# Patient Record
Sex: FEMALE | Race: WHITE | NOT HISPANIC OR LATINO | Employment: OTHER | URBAN - NONMETROPOLITAN AREA
[De-identification: names, ages, dates, MRNs, and addresses within clinical notes are randomized per-mention and may not be internally consistent; named-entity substitution may affect disease eponyms.]

---

## 2017-03-15 ENCOUNTER — CONVERSION ENCOUNTER (OUTPATIENT)
Dept: FAMILY MEDICINE CLINIC | Facility: CLINIC | Age: 62
End: 2017-03-15

## 2019-06-03 VITALS
DIASTOLIC BLOOD PRESSURE: 81 MMHG | BODY MASS INDEX: 45.07 KG/M2 | HEIGHT: 64 IN | WEIGHT: 264 LBS | SYSTOLIC BLOOD PRESSURE: 141 MMHG

## 2020-01-06 ENCOUNTER — OFFICE VISIT (OUTPATIENT)
Dept: CARDIOLOGY | Facility: CLINIC | Age: 65
End: 2020-01-06

## 2020-01-06 VITALS
HEIGHT: 64 IN | HEART RATE: 95 BPM | OXYGEN SATURATION: 94 % | SYSTOLIC BLOOD PRESSURE: 176 MMHG | WEIGHT: 267 LBS | DIASTOLIC BLOOD PRESSURE: 84 MMHG | BODY MASS INDEX: 45.58 KG/M2

## 2020-01-06 DIAGNOSIS — R07.89 CHEST TIGHTNESS: ICD-10-CM

## 2020-01-06 DIAGNOSIS — E66.01 CLASS 3 SEVERE OBESITY WITHOUT SERIOUS COMORBIDITY WITH BODY MASS INDEX (BMI) OF 45.0 TO 49.9 IN ADULT, UNSPECIFIED OBESITY TYPE (HCC): ICD-10-CM

## 2020-01-06 DIAGNOSIS — E11.9 TYPE 2 DIABETES MELLITUS WITHOUT COMPLICATION, WITHOUT LONG-TERM CURRENT USE OF INSULIN (HCC): ICD-10-CM

## 2020-01-06 DIAGNOSIS — R01.1 SYSTOLIC MURMUR: ICD-10-CM

## 2020-01-06 DIAGNOSIS — I10 ESSENTIAL HYPERTENSION: ICD-10-CM

## 2020-01-06 DIAGNOSIS — E78.2 MIXED HYPERLIPIDEMIA: ICD-10-CM

## 2020-01-06 DIAGNOSIS — Z86.73 HISTORY OF TIA (TRANSIENT ISCHEMIC ATTACK): ICD-10-CM

## 2020-01-06 DIAGNOSIS — R00.2 PALPITATIONS: Primary | ICD-10-CM

## 2020-01-06 DIAGNOSIS — R07.9 CHEST PAIN, UNSPECIFIED TYPE: ICD-10-CM

## 2020-01-06 DIAGNOSIS — J44.9 CHRONIC OBSTRUCTIVE PULMONARY DISEASE, UNSPECIFIED COPD TYPE (HCC): ICD-10-CM

## 2020-01-06 PROBLEM — E78.5 HYPERLIPIDEMIA: Status: ACTIVE | Noted: 2020-01-06

## 2020-01-06 PROBLEM — E66.9 OBESITY: Status: ACTIVE | Noted: 2020-01-06

## 2020-01-06 PROCEDURE — 93000 ELECTROCARDIOGRAM COMPLETE: CPT | Performed by: INTERNAL MEDICINE

## 2020-01-06 PROCEDURE — 99214 OFFICE O/P EST MOD 30 MIN: CPT | Performed by: INTERNAL MEDICINE

## 2020-01-06 RX ORDER — LISINOPRIL 20 MG/1
40 TABLET ORAL DAILY
COMMUNITY
Start: 2017-03-15 | End: 2023-03-29 | Stop reason: SDUPTHER

## 2020-01-06 RX ORDER — SIMVASTATIN 40 MG
TABLET ORAL
COMMUNITY
End: 2023-03-29 | Stop reason: SDUPTHER

## 2020-01-06 RX ORDER — ALBUTEROL SULFATE 2.5 MG/3ML
SOLUTION RESPIRATORY (INHALATION)
COMMUNITY

## 2020-01-06 RX ORDER — CLOPIDOGREL BISULFATE 75 MG/1
TABLET ORAL
COMMUNITY
Start: 2017-03-15 | End: 2020-05-11 | Stop reason: SDUPTHER

## 2020-01-06 RX ORDER — GABAPENTIN 300 MG/1
CAPSULE ORAL
COMMUNITY
Start: 2019-12-19 | End: 2023-03-29

## 2020-01-06 RX ORDER — CHOLECALCIFEROL (VITAMIN D3) 25 MCG
TABLET,CHEWABLE ORAL
COMMUNITY
Start: 2017-03-15

## 2020-01-06 RX ORDER — BACLOFEN 10 MG/1
TABLET ORAL
COMMUNITY
Start: 2017-03-15

## 2020-01-06 RX ORDER — DICYCLOMINE HCL 20 MG
TABLET ORAL
COMMUNITY
Start: 2017-03-15

## 2020-01-06 NOTE — PROGRESS NOTES
Cardiology Office Consultation      Encounter Date:  01/06/2020    Patient ID:   Renata Ramesh is a 64 y.o. female.    Reason For Consultation:  Abnormal echocardiogram    History of Present Illness:  Dear Dr. Lo, Kevin EID MD    I had the pleasure of seeing Renata Ramesh today. As you are well aware, this is a 64 y.o. female with no established history of ischemic heart disease.  She does however have a history of hypertension, hyperlipidemia, diabetes mellitus, morbid obesity, TIA, and valvular heart disease.  She presents today to reestablish care and discuss abnormal echocardiogram    She reports intermittent chest discomfort.  She describes it as a pressure located in the center of her chest without radiation to her back neck arm or jaw.  The discomfort can occur with and without activity.  She reports she had some relief of the discomfort when she was treated for acid reflux however the discomfort has returned.  She admits to shortness of breath with activity.  She denies any PND orthopnea.  She denies any syncope or near syncope.    She has a strong family history of premature ischemic heart disease.  1 of her brothers had bypass surgery in his late 30s and the other one had bypass surgery in his 40s.  She reports that she had an echocardiogram performed that demonstrated some leaky valves and that was the reason for her evaluation today.  I do see the results of a 2D echocardiogram that was obtained in April 2019.  Trace mitral and tricuspid insufficiencies were noted at that time.  Diastolic dysfunction was noted as well.    I am concerned with her history of nocturnal hypoxia that she may have sleep apnea.  She is being referred for testing.  I am additionally concerned about her chest discomfort and family history of premature coronary artery disease.  She underwent a stress test a few years ago however she was only able to achieve about 3 minutes on a Umberto protocol.  Despite this her myocardial  "perfusion imaging was negative.  Lastly I am concerned about her TIAs.  With her risk factor profile, if she was having paroxysmal atrial fibrillation this could be the reason for these prior neurological insults.  She is reporting intermittent palpitations.  We will obtain an ambulatory ECG monitor or a loop recorder.    Assessment & Plan    Impressions:  Valvular heart disease with mild tricuspid and mitral insufficiencies  Hypertension  Hyperlipidemia  Morbid obesity  History of TIA/cryptogenic stroke  Hyperlipidemia  Diabetes mellitus  Nocturnal hypoxia concerning for sleep apnea  Palpitations  Gastroesophageal reflux disease    Recommendations:  2D echocardiogram reviewed and demonstrates mild tricuspid and mitral insufficiencies and diastolic dysfunction  Mobile cardiac outpatient telemetry.  If MCOT not approved will move forward with loop recorder  Nuclear stress testing  Sleep study  Follow-up in 6 to 8 weeks  Further recommendations pending above    Objective:    Vitals:  Vitals:    01/06/20 1134   BP: 176/84   BP Location: Right arm   Pulse: 95   SpO2: 94%   Weight: 121 kg (267 lb)   Height: 162.6 cm (64\")       Physical Exam:    General: Alert, cooperative, no distress, appears stated age  Head:  Normocephalic, atraumatic, mucous membranes moist  Eyes:  Conjunctiva/corneas clear, EOM's intact     Neck:  Supple, thick with no bruit  Lungs: Clear to auscultation bilaterally, no wheezes rhonchi rales are noted.  Diminished at the bases  Chest wall: No tenderness  Heart::  Regular rate and rhythm, S1 and S2 normal, 1/6 holosystolic murmur.  No rub or gallop  Abdomen: Soft, non-tender, nondistended bowel sounds active  Extremities: No cyanosis, clubbing, or edema  Pulses: 2+ and symmetric all extremities  Skin:  No rashes or lesions  Neuro/psych: A&O x3. CN II through XII are grossly intact with appropriate affect    History of Present Illness      Allergies:  Allergies   Allergen Reactions   • Sulfa " Antibiotics Shortness Of Breath   • Penicillins Itching       Medication Review:     Current Outpatient Medications:   •  albuterol (PROVENTIL) (2.5 MG/3ML) 0.083% nebulizer solution, albuterol sulfate 2.5 mg/3 mL (0.083 %) solution for nebulization, Disp: , Rfl:   •  baclofen (LIORESAL) 10 MG tablet, baclofen 10 mg tablet, Disp: , Rfl:   •  clopidogrel (PLAVIX) 75 MG tablet, clopidogrel 75 mg tablet, Disp: , Rfl:   •  Cyanocobalamin (B-12) 1000 MCG capsule, B-12 1000 MCG CAPS, Disp: , Rfl:   •  dicyclomine (BENTYL) 20 MG tablet, dicyclomine 20 mg tablet, Disp: , Rfl:   •  gabapentin (NEURONTIN) 300 MG capsule, TAKE 1 CAPSULE BY MOUTH THREE TIMES DAILY AS DIRECTED FOR 30 DAYS, Disp: , Rfl:   •  lisinopril (PRINIVIL,ZESTRIL) 20 MG tablet, lisinopril 20 mg tablet, Disp: , Rfl:   •  metFORMIN (GLUCOPHAGE) 1000 MG tablet, metformin 1,000 mg tablet, Disp: , Rfl:   •  O2 (OXYGEN), Inhale As Needed., Disp: , Rfl:   •  simvastatin (ZOCOR) 40 MG tablet, simvastatin 40 mg tablet, Disp: , Rfl:     Family History:  Family History   Problem Relation Age of Onset   • Heart failure Mother    • Heart disease Father    • Diabetes Father    • Heart disease Brother    • Diabetes Brother        Past Medical History:  Past Medical History:   Diagnosis Date   • Diabetes mellitus (CMS/HCC)    • Hyperlipidemia    • Hypertension    • TIA (transient ischemic attack)        Past surgical History:  Past Surgical History:   Procedure Laterality Date   • CARDIAC CATHETERIZATION     • EYE SURGERY Right    • HYSTERECTOMY         Social History:  Social History     Socioeconomic History   • Marital status:      Spouse name: Not on file   • Number of children: Not on file   • Years of education: Not on file   • Highest education level: Not on file   Tobacco Use   • Smoking status: Former Smoker   • Smokeless tobacco: Never Used   Substance and Sexual Activity   • Alcohol use: Never     Frequency: Never   • Drug use: Never       Review of  Systems:  The following systems were reviewed as they relate to the cardiovascular system: Constitutional, Eyes, ENT, Cardiovascular, Respiratory, Gastrointestinal, Integumentary, Neurological, Psychiatric, Hematologic, Endocrine, Musculoskeletal, and Genitourinary. The pertinent cardiovascular findings are reported above with all other cardiovascular points within those systems being negative.    Diagnostic Study Review:     Current Electrocardiogram:    ECG 12 Lead  Date/Time: 1/6/2020 1:11 PM  Performed by: Jay Suárez DO  Authorized by: Jay Suárez DO   Comparison: not compared with previous ECG   Previous ECG: no previous ECG available  Comments: Normal sinus rhythm with a ventricular rate of 95 bpm.  Right axis deviation.  Low voltage in the precordial leads.  Normal QT and QTc intervals.                NOTE: The following portions of the patient's history were reviewed and updated this visit as appropriate: allergies, current medications, past family history, past medical history, past social history, past surgical history and problem list.

## 2020-01-06 NOTE — PATIENT INSTRUCTIONS
[facility]OT      (if unable to get MCOT approved will need to do loop recorder)  Lexiscan stress nuclear  Sleep study  Follow-up 6-8 weeks

## 2020-02-17 ENCOUNTER — OFFICE VISIT (OUTPATIENT)
Dept: CARDIOLOGY | Facility: CLINIC | Age: 65
End: 2020-02-17

## 2020-02-17 VITALS
BODY MASS INDEX: 45.04 KG/M2 | HEART RATE: 86 BPM | SYSTOLIC BLOOD PRESSURE: 106 MMHG | WEIGHT: 263.8 LBS | HEIGHT: 64 IN | DIASTOLIC BLOOD PRESSURE: 66 MMHG | OXYGEN SATURATION: 93 %

## 2020-02-17 DIAGNOSIS — I10 ESSENTIAL HYPERTENSION: ICD-10-CM

## 2020-02-17 DIAGNOSIS — Z86.73 HISTORY OF TIA (TRANSIENT ISCHEMIC ATTACK): ICD-10-CM

## 2020-02-17 DIAGNOSIS — R07.89 CHEST TIGHTNESS: ICD-10-CM

## 2020-02-17 DIAGNOSIS — E78.2 MIXED HYPERLIPIDEMIA: Primary | ICD-10-CM

## 2020-02-17 DIAGNOSIS — E66.01 CLASS 3 SEVERE OBESITY WITHOUT SERIOUS COMORBIDITY WITH BODY MASS INDEX (BMI) OF 45.0 TO 49.9 IN ADULT, UNSPECIFIED OBESITY TYPE (HCC): ICD-10-CM

## 2020-02-17 PROCEDURE — 99214 OFFICE O/P EST MOD 30 MIN: CPT | Performed by: INTERNAL MEDICINE

## 2020-02-17 NOTE — PROGRESS NOTES
Cardiology Office Follow-up      Encounter Date:  01/06/2020    Patient ID:   Renata Ramesh is a 64 y.o. female.    Reason For Follow-up:  Abnormal echocardiogram    History of Present Illness:  Dear Dr. Lo, Kevin EID MD    I had the pleasure of seeing Renata Ramesh today. As you are well aware, this is a 64 y.o. female with no established history of ischemic heart disease.  She does however have a history of hypertension, hyperlipidemia, diabetes mellitus, morbid obesity, TIA, and valvular heart disease.  She presents today for follow-up on the above conditions.    Interval History:  She reported at her last visit and intermittent chest discomfort.  She describes it as a pressure located in the center of her chest without radiation to her back neck arm or jaw.  The discomfort can occur with and without activity.  She reports she had some relief of the discomfort when she was treated for acid reflux however the discomfort has returned.  She admits to shortness of breath with activity.  She denies any PND orthopnea.  She denies any syncope or near syncope.  She is still reporting some paresthesias.    She has a strong family history of premature ischemic heart disease.  1 of her brothers had bypass surgery in his late 30s and the other one had bypass surgery in his 40s.  She reports that she had an echocardiogram performed that demonstrated some leaky valves and that was the reason for her evaluation today.  I do see the results of a 2D echocardiogram that was obtained in April 2019.  Trace mitral and tricuspid insufficiencies were noted at that time.  Diastolic dysfunction was noted as well.    She underwent a nuclear stress test that failed to demonstrate any provokable ischemia.  We reviewed the results of this test today in detail.  She is currently wearing an ambulatory ECG monitor.  She has undergone her evaluation for a sleep study and is scheduled to have a home sleep study in the next  "week.    Assessment & Plan    Impressions:  Valvular heart disease with mild tricuspid and mitral insufficiencies  Hypertension  Hyperlipidemia  Morbid obesity  History of TIA/cryptogenic stroke  Hyperlipidemia  Diabetes mellitus  Nocturnal hypoxia concerning for sleep apnea  Palpitations  Gastroesophageal reflux disease    Recommendations:  Follow-up with sleep medicine as directed  Continue with Mobile cardiac outpatient telemetry.  Follow-up in 3 months time sooner should there be difficulties.    Objective:    Vitals:  Vitals:    02/17/20 1146   BP: 106/66   BP Location: Left arm   Pulse: 86   SpO2: 93%   Weight: 120 kg (263 lb 12.8 oz)   Height: 162.6 cm (64\")       Physical Exam:    General: Alert, cooperative, no distress, appears stated age  Head:  Normocephalic, atraumatic, mucous membranes moist  Eyes:  Conjunctiva/corneas clear, EOM's intact     Neck:  Supple, thick with no bruit  Lungs: Clear to auscultation bilaterally, no wheezes rhonchi rales are noted.  Diminished at the bases  Chest wall: No tenderness  Heart::  Regular rate and rhythm, S1 and S2 normal, 1/6 holosystolic murmur.  No rub or gallop  Abdomen: Soft, non-tender, nondistended bowel sounds active  Extremities: No cyanosis, clubbing, or edema  Pulses: 2+ and symmetric all extremities  Skin:  No rashes or lesions  Neuro/psych: A&O x3. CN II through XII are grossly intact with appropriate affect    History of Present Illness      Allergies:  Allergies   Allergen Reactions   • Sulfa Antibiotics Shortness Of Breath   • Penicillins Itching       Medication Review:     Current Outpatient Medications:   •  albuterol (PROVENTIL) (2.5 MG/3ML) 0.083% nebulizer solution, albuterol sulfate 2.5 mg/3 mL (0.083 %) solution for nebulization, Disp: , Rfl:   •  baclofen (LIORESAL) 10 MG tablet, baclofen 10 mg tablet, Disp: , Rfl:   •  clopidogrel (PLAVIX) 75 MG tablet, clopidogrel 75 mg tablet, Disp: , Rfl:   •  Cyanocobalamin (B-12) 1000 MCG capsule, B-12 " 1000 MCG CAPS, Disp: , Rfl:   •  dicyclomine (BENTYL) 20 MG tablet, dicyclomine 20 mg tablet, Disp: , Rfl:   •  gabapentin (NEURONTIN) 300 MG capsule, TAKE 1 CAPSULE BY MOUTH THREE TIMES DAILY AS DIRECTED FOR 30 DAYS, Disp: , Rfl:   •  lisinopril (PRINIVIL,ZESTRIL) 20 MG tablet, lisinopril 20 mg tablet, Disp: , Rfl:   •  metFORMIN (GLUCOPHAGE) 1000 MG tablet, Daily., Disp: , Rfl:   •  O2 (OXYGEN), Inhale As Needed., Disp: , Rfl:   •  simvastatin (ZOCOR) 40 MG tablet, simvastatin 40 mg tablet, Disp: , Rfl:     Family History:  Family History   Problem Relation Age of Onset   • Heart failure Mother    • Heart disease Father    • Diabetes Father    • Heart disease Brother    • Diabetes Brother        Past Medical History:  Past Medical History:   Diagnosis Date   • Diabetes mellitus (CMS/HCC)    • Hyperlipidemia    • Hypertension    • TIA (transient ischemic attack)        Past surgical History:  Past Surgical History:   Procedure Laterality Date   • CARDIAC CATHETERIZATION     • EYE SURGERY Right    • HYSTERECTOMY         Social History:  Social History     Socioeconomic History   • Marital status:      Spouse name: Not on file   • Number of children: Not on file   • Years of education: Not on file   • Highest education level: Not on file   Tobacco Use   • Smoking status: Former Smoker     Types: Cigarettes   • Smokeless tobacco: Never Used   Substance and Sexual Activity   • Alcohol use: Never     Frequency: Never   • Drug use: Never       Review of Systems:  The following systems were reviewed as they relate to the cardiovascular system: Constitutional, Eyes, ENT, Cardiovascular, Respiratory, Gastrointestinal, Integumentary, Neurological, Psychiatric, Hematologic, Endocrine, Musculoskeletal, and Genitourinary. The pertinent cardiovascular findings are reported above with all other cardiovascular points within those systems being negative.    Diagnostic Study Review:     Current Electrocardiogram:  Procedures  no new EKG noted.  EKG dated 1/6/2020 demonstrates sinus rhythm with a ventricular rate of 95 bpm and right axis deviation.        NOTE: The following portions of the patient's history were reviewed and updated this visit as appropriate: allergies, current medications, past family history, past medical history, past social history, past surgical history and problem list.

## 2020-05-11 ENCOUNTER — OFFICE VISIT (OUTPATIENT)
Dept: CARDIOLOGY | Facility: CLINIC | Age: 65
End: 2020-05-11

## 2020-05-11 VITALS
SYSTOLIC BLOOD PRESSURE: 131 MMHG | BODY MASS INDEX: 44.73 KG/M2 | WEIGHT: 262 LBS | HEIGHT: 64 IN | DIASTOLIC BLOOD PRESSURE: 80 MMHG

## 2020-05-11 DIAGNOSIS — E78.2 MIXED HYPERLIPIDEMIA: ICD-10-CM

## 2020-05-11 DIAGNOSIS — I38 VALVULAR HEART DISEASE: ICD-10-CM

## 2020-05-11 DIAGNOSIS — G47.33 OBSTRUCTIVE SLEEP APNEA: ICD-10-CM

## 2020-05-11 DIAGNOSIS — E66.01 CLASS 3 SEVERE OBESITY WITHOUT SERIOUS COMORBIDITY WITH BODY MASS INDEX (BMI) OF 45.0 TO 49.9 IN ADULT, UNSPECIFIED OBESITY TYPE (HCC): ICD-10-CM

## 2020-05-11 DIAGNOSIS — E11.9 TYPE 2 DIABETES MELLITUS WITHOUT COMPLICATION, WITHOUT LONG-TERM CURRENT USE OF INSULIN (HCC): ICD-10-CM

## 2020-05-11 DIAGNOSIS — I10 ESSENTIAL HYPERTENSION: Primary | ICD-10-CM

## 2020-05-11 PROCEDURE — 99442 PR PHYS/QHP TELEPHONE EVALUATION 11-20 MIN: CPT | Performed by: INTERNAL MEDICINE

## 2020-05-11 RX ORDER — CLOPIDOGREL BISULFATE 75 MG/1
75 TABLET ORAL DAILY
Qty: 90 TABLET | Refills: 3 | Status: SHIPPED | OUTPATIENT
Start: 2020-05-11

## 2020-05-11 NOTE — PROGRESS NOTES
Cardiology Telephone Visit    Encounter Date:  05/11/2020    Patient ID:   Renata Ramesh is a 64 y.o. female.    Reason For Followup:  Hypertension  Hyperlipidemia  Cryptogenic stroke    Brief Clinical History:  Dear Dr. Lo, Kevin EID MD    I had the pleasure of performing a telephone visit with Renata Ramesh today. As you are well aware, this is a 64 y.o. female with no established history of ischemic heart disease.  She does however have a history of hypertension, hyperlipidemia, diabetes mellitus, morbid obesity, TIA, obstructive sleep apnea, and valvular heart disease.  She presents today for follow-up on the above conditions.     Interval History:  She denies any chest pain pressure heaviness or tightness.  She denies any shortness of breath out of character.  She denies any PND orthopnea.  She denies any syncope or near syncope.     She has a strong family history of premature ischemic heart disease.  1 of her brothers had bypass surgery in his late 30s and the other one had bypass surgery in his 40s.      She had an echocardiogram performed in April 2019. Trace mitral and tricuspid insufficiencies were noted at that time.  Diastolic dysfunction was noted as well.     She underwent a nuclear stress test in January 2020 that failed to demonstrate any provokable ischemia.  We reviewed the results of this test today in detail.    She additionally had an ambulatory ECG monitor performed in January as well that failed to demonstrate any significant dysrhythmias.  She is currently wearing an ambulatory ECG monitor.    She reports that she underwent an evaluation for sleep study and was found to have sleep apnea.  She was started on CPAP and she reports that this is made a big difference in her clinical conditions.  She reports having more energy and is sleeping much better.  She feels it is helping with her breathing as well.     Assessment & Plan     Impressions:  Valvular heart disease with mild tricuspid  "and mitral insufficiencies  Hypertension  Hyperlipidemia  Morbid obesity  History of TIA/cryptogenic stroke  Hyperlipidemia  Diabetes mellitus  Obstructive sleep apnea currently utilizing CPAP  Palpitations  Gastroesophageal reflux disease     Recommendations:  Continuation of her current cardiovascular regimen at the present time.  Follow-up in 6 months time sooner should there be difficulties.    Vitals:  Vitals:    05/11/20 1310   BP: 131/80   Weight: 119 kg (262 lb)   Height: 162.6 cm (64\")       Allergies:  Allergies   Allergen Reactions   • Sulfa Antibiotics Shortness Of Breath   • Penicillins Itching       Medication Review:     Current Outpatient Medications:   •  albuterol (PROVENTIL) (2.5 MG/3ML) 0.083% nebulizer solution, albuterol sulfate 2.5 mg/3 mL (0.083 %) solution for nebulization, Disp: , Rfl:   •  baclofen (LIORESAL) 10 MG tablet, baclofen 10 mg tablet, Disp: , Rfl:   •  clopidogrel (PLAVIX) 75 MG tablet, Take 1 tablet by mouth Daily., Disp: 90 tablet, Rfl: 3  •  Cyanocobalamin (B-12) 1000 MCG capsule, B-12 1000 MCG CAPS, Disp: , Rfl:   •  dicyclomine (BENTYL) 20 MG tablet, dicyclomine 20 mg tablet, Disp: , Rfl:   •  gabapentin (NEURONTIN) 300 MG capsule, TAKE 1 CAPSULE BY MOUTH THREE TIMES DAILY AS DIRECTED FOR 30 DAYS, Disp: , Rfl:   •  lisinopril (PRINIVIL,ZESTRIL) 20 MG tablet, lisinopril 20 mg tablet, Disp: , Rfl:   •  metFORMIN (GLUCOPHAGE) 1000 MG tablet, Daily., Disp: , Rfl:   •  O2 (OXYGEN), Inhale As Needed., Disp: , Rfl:   •  simvastatin (ZOCOR) 40 MG tablet, simvastatin 40 mg tablet, Disp: , Rfl:     Family History:  Family History   Problem Relation Age of Onset   • Heart failure Mother    • Heart disease Father    • Diabetes Father    • Heart disease Brother    • Diabetes Brother        Past Medical History:  Past Medical History:   Diagnosis Date   • Diabetes mellitus (CMS/HCC)    • Hyperlipidemia    • Hypertension    • TIA (transient ischemic attack)        Past surgical " History:  Past Surgical History:   Procedure Laterality Date   • CARDIAC CATHETERIZATION     • EYE SURGERY Right    • HYSTERECTOMY         Social History:  Social History     Socioeconomic History   • Marital status:      Spouse name: Not on file   • Number of children: Not on file   • Years of education: Not on file   • Highest education level: Not on file   Tobacco Use   • Smoking status: Former Smoker     Types: Cigarettes   • Smokeless tobacco: Never Used   Substance and Sexual Activity   • Alcohol use: Never     Frequency: Never   • Drug use: Never       Review of Systems:  The following systems were reviewed as they relate to the cardiovascular system: Constitutional, Eyes, ENT, Cardiovascular, Respiratory, Gastrointestinal, Integumentary, Neurological, Psychiatric, Hematologic, Endocrine, Musculoskeletal, and Genitourinary. The pertinent cardiovascular findings are reported above with all other cardiovascular points within those systems being negative.    Diagnostic Study Review:     Current Electrocardiogram:  Procedures      NOTE: The following portions of the patient's history were reviewed and updated this visit as appropriate: allergies, current medications, past family history, past medical history, past social history, past surgical history and problem list.    A total of 15 minutes of medical discussion occurred during this encounter.      Novel Coronavirus (COVID-19) Disclaimer:     A proclamation declaring a national emergency concerning the Novel Coronavirus Disease (COVID-19) Outbreak was issued on March 13, 2020 at the direction of the .    This virtual visit was performed with the patient's consent in lieu of the patient's regularly scheduled appointment in order to provide the patient with the opportunity to maintain contact with their healthcare provider while also adhering to social distancing guidelines set forth by the CDC to reduce exposure to the Novel  Coronavirus (COVID-19).  Any vital signs obtained during this visit were provided by the patient.

## 2021-03-24 ENCOUNTER — TELEPHONE (OUTPATIENT)
Dept: CARDIOLOGY | Facility: CLINIC | Age: 66
End: 2021-03-24

## 2021-03-24 ENCOUNTER — APPOINTMENT (OUTPATIENT)
Dept: CT IMAGING | Facility: HOSPITAL | Age: 66
End: 2021-03-24

## 2021-03-24 ENCOUNTER — HOSPITAL ENCOUNTER (EMERGENCY)
Facility: HOSPITAL | Age: 66
Discharge: HOME | End: 2021-03-24
Attending: EMERGENCY MEDICINE | Admitting: EMERGENCY MEDICINE

## 2021-03-24 VITALS
OXYGEN SATURATION: 97 % | SYSTOLIC BLOOD PRESSURE: 145 MMHG | HEIGHT: 64 IN | TEMPERATURE: 98.1 F | WEIGHT: 260 LBS | HEART RATE: 91 BPM | DIASTOLIC BLOOD PRESSURE: 91 MMHG | BODY MASS INDEX: 44.39 KG/M2 | RESPIRATION RATE: 15 BRPM

## 2021-03-24 DIAGNOSIS — R20.2 PARESTHESIAS: Primary | ICD-10-CM

## 2021-03-24 LAB
ANION GAP SERPL CALCULATED.3IONS-SCNC: 11 MMOL/L (ref 5–15)
BASOPHILS # BLD AUTO: 0 10*3/MM3 (ref 0–0.2)
BASOPHILS NFR BLD AUTO: 0.7 % (ref 0–1.5)
BUN SERPL-MCNC: 12 MG/DL (ref 8–23)
BUN/CREAT SERPL: 10.4 (ref 7–25)
CALCIUM SPEC-SCNC: 9.6 MG/DL (ref 8.6–10.5)
CHLORIDE SERPL-SCNC: 103 MMOL/L (ref 98–107)
CO2 SERPL-SCNC: 27 MMOL/L (ref 22–29)
CREAT SERPL-MCNC: 1.15 MG/DL (ref 0.57–1)
DEPRECATED RDW RBC AUTO: 43.3 FL (ref 37–54)
EOSINOPHIL # BLD AUTO: 0.2 10*3/MM3 (ref 0–0.4)
EOSINOPHIL NFR BLD AUTO: 2.4 % (ref 0.3–6.2)
ERYTHROCYTE [DISTWIDTH] IN BLOOD BY AUTOMATED COUNT: 14.7 % (ref 12.3–15.4)
GFR SERPL CREATININE-BSD FRML MDRD: 47 ML/MIN/1.73
GLUCOSE SERPL-MCNC: 105 MG/DL (ref 65–99)
HCT VFR BLD AUTO: 40.2 % (ref 34–46.6)
HGB BLD-MCNC: 13.3 G/DL (ref 12–15.9)
HOLD SPECIMEN: NORMAL
HOLD SPECIMEN: NORMAL
LYMPHOCYTES # BLD AUTO: 1.6 10*3/MM3 (ref 0.7–3.1)
LYMPHOCYTES NFR BLD AUTO: 23.3 % (ref 19.6–45.3)
MCH RBC QN AUTO: 27.9 PG (ref 26.6–33)
MCHC RBC AUTO-ENTMCNC: 33.2 G/DL (ref 31.5–35.7)
MCV RBC AUTO: 84.2 FL (ref 79–97)
MONOCYTES # BLD AUTO: 0.6 10*3/MM3 (ref 0.1–0.9)
MONOCYTES NFR BLD AUTO: 8.4 % (ref 5–12)
NEUTROPHILS NFR BLD AUTO: 4.3 10*3/MM3 (ref 1.7–7)
NEUTROPHILS NFR BLD AUTO: 65.2 % (ref 42.7–76)
NRBC BLD AUTO-RTO: 0.1 /100 WBC (ref 0–0.2)
PLATELET # BLD AUTO: 275 10*3/MM3 (ref 140–450)
PMV BLD AUTO: 8.2 FL (ref 6–12)
POTASSIUM SERPL-SCNC: 4 MMOL/L (ref 3.5–5.2)
RBC # BLD AUTO: 4.77 10*6/MM3 (ref 3.77–5.28)
SODIUM SERPL-SCNC: 141 MMOL/L (ref 136–145)
WBC # BLD AUTO: 6.7 10*3/MM3 (ref 3.4–10.8)
WHOLE BLOOD HOLD SPECIMEN: NORMAL
WHOLE BLOOD HOLD SPECIMEN: NORMAL

## 2021-03-24 PROCEDURE — 85025 COMPLETE CBC W/AUTO DIFF WBC: CPT | Performed by: EMERGENCY MEDICINE

## 2021-03-24 PROCEDURE — 80048 BASIC METABOLIC PNL TOTAL CA: CPT | Performed by: EMERGENCY MEDICINE

## 2021-03-24 PROCEDURE — 93005 ELECTROCARDIOGRAM TRACING: CPT | Performed by: EMERGENCY MEDICINE

## 2021-03-24 PROCEDURE — 70450 CT HEAD/BRAIN W/O DYE: CPT

## 2021-03-24 PROCEDURE — 99284 EMERGENCY DEPT VISIT MOD MDM: CPT

## 2021-03-24 RX ORDER — SODIUM CHLORIDE 0.9 % (FLUSH) 0.9 %
10 SYRINGE (ML) INJECTION AS NEEDED
Status: DISCONTINUED | OUTPATIENT
Start: 2021-03-24 | End: 2021-03-24 | Stop reason: HOSPADM

## 2021-03-24 NOTE — TELEPHONE ENCOUNTER
Received call from patient stating that she was experiencing right sided  facial numbness, right arm numbness and lip numbness.  Symptomatic for past 3 days.  Advised by MA to have patient proceed to ER at [facility].

## 2021-03-24 NOTE — ED NOTES
Pt has history of COPD and uses CPAP at [facility]     Viridiana Alcala  03/24/21 0499

## 2021-03-24 NOTE — ED PROVIDER NOTES
Subjective   Patient is a 65-year-old female complaining of several day history of intermittent tingling to the left side of her face and right arm and her right leg.  She states her right arm tingling is chronic for the past several months.  Patient states she has had similar symptoms in the past she states related to TIA.  She denies headache cough fever chest pain shortness of breath or other complaint          Review of Systems  Negative for headache ears no cough fever chest pain shortness of breath abdominal pain vomiting diarrhea dysuria is weight loss or other complaint.  A complete a systems was obtained and is otherwise negative  Past Medical History:   Diagnosis Date   • Diabetes mellitus (CMS/HCC)    • Hyperlipidemia    • Hypertension    • TIA (transient ischemic attack)        Allergies   Allergen Reactions   • Sulfa Antibiotics Shortness Of Breath   • Penicillins Itching       Past Surgical History:   Procedure Laterality Date   • CARDIAC CATHETERIZATION     • EYE SURGERY Right    • HYSTERECTOMY         Family History   Problem Relation Age of Onset   • Heart failure Mother    • Heart disease Father    • Diabetes Father    • Heart disease Brother    • Diabetes Brother        Social History     Socioeconomic History   • Marital status:      Spouse name: Not on file   • Number of children: Not on file   • Years of education: Not on file   • Highest education level: Not on file   Tobacco Use   • Smoking status: Former Smoker     Types: Cigarettes   • Smokeless tobacco: Never Used   Substance and Sexual Activity   • Alcohol use: Never   • Drug use: Never           Objective   Physical Exam  Neurologic exam is nonfocal.  HEENT exam shows TMs to be clear.  Oropharynx comers but sclerae nonicteric.  Neck has no adenopathy JVD or bruits.  Lungs are clear.  Heart has regular rate and rhythm without murmur gallop.  Chest is nontender.  Abdomen is soft nontender.  Extremity exam is no cyanosis or  edema.  Procedures       My EKG interpretation shows normal sinus rhythm with no acute ST change    ED Course      Results for orders placed or performed during the hospital encounter of 03/24/21   Basic Metabolic Panel    Specimen: Blood   Result Value Ref Range    Glucose 105 (H) 65 - 99 mg/dL    BUN 12 8 - 23 mg/dL    Creatinine 1.15 (H) 0.57 - 1.00 mg/dL    Sodium 141 136 - 145 mmol/L    Potassium 4.0 3.5 - 5.2 mmol/L    Chloride 103 98 - 107 mmol/L    CO2 27.0 22.0 - 29.0 mmol/L    Calcium 9.6 8.6 - 10.5 mg/dL    eGFR Non African Amer 47 (L) >60 mL/min/1.73    BUN/Creatinine Ratio 10.4 7.0 - 25.0    Anion Gap 11.0 5.0 - 15.0 mmol/L   CBC Auto Differential    Specimen: Blood   Result Value Ref Range    WBC 6.70 3.40 - 10.80 10*3/mm3    RBC 4.77 3.77 - 5.28 10*6/mm3    Hemoglobin 13.3 12.0 - 15.9 g/dL    Hematocrit 40.2 34.0 - 46.6 %    MCV 84.2 79.0 - 97.0 fL    MCH 27.9 26.6 - 33.0 pg    MCHC 33.2 31.5 - 35.7 g/dL    RDW 14.7 12.3 - 15.4 %    RDW-SD 43.3 37.0 - 54.0 fl    MPV 8.2 6.0 - 12.0 fL    Platelets 275 140 - 450 10*3/mm3    Neutrophil % 65.2 42.7 - 76.0 %    Lymphocyte % 23.3 19.6 - 45.3 %    Monocyte % 8.4 5.0 - 12.0 %    Eosinophil % 2.4 0.3 - 6.2 %    Basophil % 0.7 0.0 - 1.5 %    Neutrophils, Absolute 4.30 1.70 - 7.00 10*3/mm3    Lymphocytes, Absolute 1.60 0.70 - 3.10 10*3/mm3    Monocytes, Absolute 0.60 0.10 - 0.90 10*3/mm3    Eosinophils, Absolute 0.20 0.00 - 0.40 10*3/mm3    Basophils, Absolute 0.00 0.00 - 0.20 10*3/mm3    nRBC 0.1 0.0 - 0.2 /100 WBC   ECG 12 Lead   Result Value Ref Range    QT Interval 367 ms   Light Blue Top   Result Value Ref Range    Extra Tube hold for add-on    Green Top (Gel)   Result Value Ref Range    Extra Tube Hold for add-ons.    Lavender Top   Result Value Ref Range    Extra Tube hold for add-on    Gold Top - SST   Result Value Ref Range    Extra Tube Hold for add-ons.      CT Head Without Contrast    Result Date: 3/24/2021  Normal study.  Electronically Signed  By-Luis Keller MD On:3/24/2021 6:29 PM This report was finalized on [id number] by  Luis Keller MD.                                         MDM  Number of Diagnoses or Management Options  Diagnosis management comments: Patient has benign physical exam.  She has no focal neurologic deficits.  CT scan of the head without contrast was normal.  There is no evidence of metabolic abnormality.  Patient will be discharged.  She is infected follow-up with family physician for further outpatient evaluation as needed.       Amount and/or Complexity of Data Reviewed  Clinical lab tests: reviewed  Tests in the radiology section of CPT®: reviewed  Tests in the medicine section of CPT®: reviewed    Risk of Complications, Morbidity, and/or Mortality  Presenting problems: high  Diagnostic procedures: high  Management options: high    Patient Progress  Patient progress: stable      Final diagnoses:   Paresthesias       ED Disposition  ED Disposition     ED Disposition Condition Comment    Discharge Stable           No follow-up provider specified.       Medication List      No changes were made to your prescriptions during this visit.          Richie Fall MD  03/24/21 1906

## 2021-03-27 LAB — QT INTERVAL: 367 MS

## 2023-03-29 ENCOUNTER — TELEPHONE (OUTPATIENT)
Dept: CARDIOLOGY | Facility: CLINIC | Age: 68
End: 2023-03-29

## 2023-03-29 ENCOUNTER — OFFICE VISIT (OUTPATIENT)
Dept: CARDIOLOGY | Facility: CLINIC | Age: 68
End: 2023-03-29
Payer: MEDICARE

## 2023-03-29 VITALS
SYSTOLIC BLOOD PRESSURE: 166 MMHG | WEIGHT: 250 LBS | OXYGEN SATURATION: 95 % | HEART RATE: 82 BPM | DIASTOLIC BLOOD PRESSURE: 87 MMHG | BODY MASS INDEX: 42.91 KG/M2

## 2023-03-29 DIAGNOSIS — I38 VALVULAR HEART DISEASE: Primary | ICD-10-CM

## 2023-03-29 DIAGNOSIS — I10 PRIMARY HYPERTENSION: ICD-10-CM

## 2023-03-29 DIAGNOSIS — E78.2 MIXED HYPERLIPIDEMIA: ICD-10-CM

## 2023-03-29 DIAGNOSIS — M79.605 LEG PAIN, DIFFUSE, LEFT: ICD-10-CM

## 2023-03-29 DIAGNOSIS — G47.33 OBSTRUCTIVE SLEEP APNEA: ICD-10-CM

## 2023-03-29 DIAGNOSIS — I73.89 OTHER SPECIFIED PERIPHERAL VASCULAR DISEASES: ICD-10-CM

## 2023-03-29 PROBLEM — R01.1 HEART MURMUR: Status: ACTIVE | Noted: 2023-03-29

## 2023-03-29 PROBLEM — E66.01 MORBID OBESITY: Status: ACTIVE | Noted: 2023-03-29

## 2023-03-29 PROBLEM — I05.9 MITRAL VALVE DISORDER: Status: ACTIVE | Noted: 2023-03-29

## 2023-03-29 PROBLEM — K44.9 HIATAL HERNIA: Status: ACTIVE | Noted: 2023-03-29

## 2023-03-29 RX ORDER — DONEPEZIL HYDROCHLORIDE 5 MG/1
5 TABLET, FILM COATED ORAL NIGHTLY
COMMUNITY

## 2023-03-29 RX ORDER — SIMVASTATIN 40 MG
40 TABLET ORAL NIGHTLY
Qty: 90 TABLET | Refills: 3 | Status: SHIPPED | OUTPATIENT
Start: 2023-03-29

## 2023-03-29 RX ORDER — EZETIMIBE 10 MG/1
10 TABLET ORAL DAILY
COMMUNITY

## 2023-03-29 RX ORDER — OMEPRAZOLE 40 MG/1
40 CAPSULE, DELAYED RELEASE ORAL DAILY
COMMUNITY

## 2023-03-29 RX ORDER — LISINOPRIL 40 MG/1
40 TABLET ORAL DAILY
Qty: 90 TABLET | Refills: 3 | Status: SHIPPED | OUTPATIENT
Start: 2023-03-29

## 2023-03-29 NOTE — TELEPHONE ENCOUNTER
Caller: Ilsa Handy    Relationship to patient: Emergency Contact    Best call back number:     Patient is needing: PATIENT'S DAUGHTER ILSA STATED THAT THE PATIENT DIDN'T HAVE THE HEART DOCTORS NAME FROM [facility]. IF KAREN PAYAN HAS QUESTIONS OR NEEDS ANYTHING FROM PATIENT PLEASE GIVE HER A CALL. THANK YOU.

## 2023-03-29 NOTE — PROGRESS NOTES
"Cardiology Office Consultation      Encounter Date:  03/29/2023    Patient ID:   Renata Ramesh is a 67 y.o. female.    Reason For Consultation:      History of Present Illness:  Dear  Kevin Lo MD  Ms. Ramesh is a 67 y.o. female with no established history of ischemic heart disease.  She does however have a history of hypertension, hyperlipidemia, diabetes mellitus, morbid obesity, TIA, obstructive sleep apnea, and valvular heart disease. She underwent a nuclear stress test in January 2020 that failed to demonstrate any provokable ischemia.  We reviewed the results of this test today in detail.   She additionally had an ambulatory ECG monitor performed in January as well that failed to demonstrate any significant dysrhythmias.  TTE performed [facility]  12/19/2019: EF 60-65%, trace MR, negative bubble study.  She has a strong family history of premature ischemic heart disease.  1 of her brothers had bypass surgery in his late 30s and the other one had bypass surgery in his 40s.   Her last office visit was May 2020.  She presents to our office today to reestablish care and for further evaluation of blood pressure as well as left leg pain.    The patient is a very difficult historian with some of the descriptors that she uses.  She reports a 3-month history of discomfort in her left lower extremity all the time, it waxes and wanes, sometimes mild and sometimes severe.  Occasionally the pain radiates up into her left hip.  She describes it as \"deep down into the bone\" aching.  She reports the discomfort is not necessarily affected by ambulation.  She reports intermittent swelling of her left foot and a \"knot\" to her heel on top of her Achilles tendon.  The patient states that she had what is described as an ultrasound of her right lower extremity in January of this year and was unsure why, however she did go on to say that she has had occasional aching in her right calf.  The right lower extremity discomfort has " "resolved and her discomfort is now in her left lower extremity.    Ms. Ramesh stated that she has history of \"mini strokes\".  She stated that they are occurring more frequently.  She describes this episode as a \"pulling\" in the left side of her neck that actually causes her mouth to draw sideways.  This occasionally is accompanied by a numb feeling in the left side of her mouth.  She stated that takes a baclofen tablet and her symptoms resolve.  She has been told that she has intermittent esophageal spasms for which she takes Bentyl as well as a hiatal hernia.    Assessment & Plan    Impressions:  Valvular heart disease with mild tricuspid and mitral insufficiencies  Hypertension  Hyperlipidemia  Morbid obesity  History of TIA/cryptogenic stroke  Hyperlipidemia  Diabetes mellitus  Obstructive sleep apnea compliant with CPAP  Palpitations  Gastroesophageal reflux disease     Recommendations:  CHETNA Doppler left lower extremity  Increase lisinopril to 40 mg daily  We will try to obtain prior records to clarify TIA, right leg scan, prior cardiology work-up.  I explained to the patient and daughter that muscle relaxants are not prescribed for \"mini strokes\" or TIAs.  What she is describing sounds like a muscle spasm in her neck.  I hope to get further clarification once I obtain records.  Follow-up after testing    Diagnoses and all orders for this visit:    1. Leg pain, diffuse, left (Primary)  -     Doppler Ankle Brachial Index Single Level CAR; Future    2. Other specified peripheral vascular diseases (HCC)  -     Doppler Ankle Brachial Index Single Level CAR; Future    Other orders  -     lisinopril (PRINIVIL,ZESTRIL) 40 MG tablet; Take 1 tablet by mouth Daily.  Dispense: 90 tablet; Refill: 3      Objective:    Vitals:  Vitals:    03/29/23 1009 03/29/23 1018   BP: 170/84 166/87   BP Location: Left arm    Patient Position: Sitting    Cuff Size: Large Adult    Pulse: 82    SpO2: 95%    Weight: 113 kg (250 lb)  "       Review of Systems   Cardiovascular:        Left lower extremity pain without claudication   All other systems reviewed and are negative.      Review of Systems:  The following systems were reviewed as they relate to the cardiovascular system: Constitutional, Eyes, ENT, Cardiovascular, Respiratory, Gastrointestinal, Integumentary, Neurological, Psychiatric, Hematologic, Endocrine, Musculoskeletal, and Genitourinary. The pertinent cardiovascular findings are reported above with all other cardiovascular points within those systems being negative.    Physical Exam:     General: Alert, cooperative, no distress, appears stated age  Head:  Normocephalic, atraumatic, mucous membranes moist  Eyes:  Conjunctiva/corneas clear, EOM's intact     Neck:  Supple,  no adenopathy;      Lungs: Clear to auscultation bilaterally, no wheezes rhonchi rales are noted  Chest wall: No tenderness  Heart::  Regular rate and rhythm, S1 and S2 normal, 1/6 holosystolic murmur  Musculoskeletal:   Ambulates freely without assistance  Abdomen: Soft, non-tender, nondistended bowel sounds active  Extremities: No cyanosis, clubbing, or edema  Pulses: 2+ and symmetric all extremities  Skin:  No rashes or lesions  Neuro/psych: A&O x3. CN II through XII are grossly intact with appropriate affect    History of Present Illness      ECG 12 Lead    Date/Time: 3/29/2023 1:00 PM  Performed by: Halina Nur APRN  Authorized by: Halina Nur APRN   Comparison: not compared with previous ECG   Previous ECG: no previous ECG available  Rhythm: sinus rhythm  BPM: 82              Allergies:  Allergies   Allergen Reactions   • Sulfa Antibiotics Shortness Of Breath   • Zocor [Simvastatin] Myalgia   • Penicillins Itching       Medication Review:     Current Outpatient Medications:   •  albuterol (PROVENTIL) (2.5 MG/3ML) 0.083% nebulizer solution, albuterol sulfate 2.5 mg/3 mL (0.083 %) solution for nebulization, Disp: , Rfl:   •  baclofen  (LIORESAL) 10 MG tablet, baclofen 10 mg tablet, Disp: , Rfl:   •  clopidogrel (PLAVIX) 75 MG tablet, Take 1 tablet by mouth Daily., Disp: 90 tablet, Rfl: 3  •  Cyanocobalamin (B-12) 1000 MCG capsule, B-12 1000 MCG CAPS, Disp: , Rfl:   •  dicyclomine (BENTYL) 20 MG tablet, dicyclomine 20 mg tablet, Disp: , Rfl:   •  donepezil (ARICEPT) 5 MG tablet, Take 1 tablet by mouth Every Night., Disp: , Rfl:   •  ezetimibe (ZETIA) 10 MG tablet, Take 1 tablet by mouth Daily., Disp: , Rfl:   •  lisinopril (PRINIVIL,ZESTRIL) 40 MG tablet, Take 1 tablet by mouth Daily., Disp: 90 tablet, Rfl: 3  •  omeprazole (priLOSEC) 40 MG capsule, Take 1 capsule by mouth Daily., Disp: , Rfl:   •  gabapentin (NEURONTIN) 300 MG capsule, TAKE 1 CAPSULE BY MOUTH THREE TIMES DAILY AS DIRECTED FOR 30 DAYS, Disp: , Rfl:   •  metFORMIN (GLUCOPHAGE) 1000 MG tablet, Daily., Disp: , Rfl:   •  O2 (OXYGEN), Inhale As Needed., Disp: , Rfl:   •  simvastatin (ZOCOR) 40 MG tablet, simvastatin 40 mg tablet, Disp: , Rfl:     Family History:  Family History   Problem Relation Age of Onset   • Heart failure Mother    • Heart disease Father    • Diabetes Father    • Heart disease Brother    • Diabetes Brother        Past Medical History:  Past Medical History:   Diagnosis Date   • Broken arm    • Diabetes mellitus (HCC)    • Hyperlipidemia    • Hypertension    • TIA (transient ischemic attack)        Past surgical History:  Past Surgical History:   Procedure Laterality Date   • CARDIAC CATHETERIZATION     • EYE SURGERY Right    • HYSTERECTOMY         Social History:  Social History     Socioeconomic History   • Marital status:    Tobacco Use   • Smoking status: Former     Types: Cigarettes   • Smokeless tobacco: Never   Vaping Use   • Vaping Use: Never used   Substance and Sexual Activity   • Alcohol use: Never   • Drug use: Never           NOTE: The following portions of the patient's history were reviewed and updated this visit as appropriate: allergies,  "current medications, past family history, past medical history, past social history, past surgical history and problem list.    EMR Dragon/Transcription:   \"Dictated utilizing Dragon dictation\".     "